# Patient Record
Sex: MALE | Race: WHITE
[De-identification: names, ages, dates, MRNs, and addresses within clinical notes are randomized per-mention and may not be internally consistent; named-entity substitution may affect disease eponyms.]

---

## 2018-08-28 ENCOUNTER — HOSPITAL ENCOUNTER (OUTPATIENT)
Dept: HOSPITAL 25 - ED | Age: 38
Setting detail: OBSERVATION
LOS: 1 days | Discharge: HOME | End: 2018-08-29
Attending: INTERNAL MEDICINE | Admitting: HOSPITALIST
Payer: COMMERCIAL

## 2018-08-28 DIAGNOSIS — R06.09: ICD-10-CM

## 2018-08-28 DIAGNOSIS — Z88.8: ICD-10-CM

## 2018-08-28 DIAGNOSIS — R53.83: ICD-10-CM

## 2018-08-28 DIAGNOSIS — Z88.2: ICD-10-CM

## 2018-08-28 DIAGNOSIS — Z79.899: ICD-10-CM

## 2018-08-28 DIAGNOSIS — I10: ICD-10-CM

## 2018-08-28 DIAGNOSIS — E66.9: ICD-10-CM

## 2018-08-28 DIAGNOSIS — M54.2: ICD-10-CM

## 2018-08-28 DIAGNOSIS — N17.9: ICD-10-CM

## 2018-08-28 DIAGNOSIS — R79.89: ICD-10-CM

## 2018-08-28 DIAGNOSIS — R94.31: ICD-10-CM

## 2018-08-28 DIAGNOSIS — R07.9: Primary | ICD-10-CM

## 2018-08-28 DIAGNOSIS — I51.7: ICD-10-CM

## 2018-08-28 PROCEDURE — 80061 LIPID PANEL: CPT

## 2018-08-28 PROCEDURE — 76770 US EXAM ABDO BACK WALL COMP: CPT

## 2018-08-28 PROCEDURE — 93005 ELECTROCARDIOGRAM TRACING: CPT

## 2018-08-28 PROCEDURE — G0378 HOSPITAL OBSERVATION PER HR: HCPCS

## 2018-08-28 PROCEDURE — 96361 HYDRATE IV INFUSION ADD-ON: CPT

## 2018-08-28 PROCEDURE — 80048 BASIC METABOLIC PNL TOTAL CA: CPT

## 2018-08-28 PROCEDURE — 82550 ASSAY OF CK (CPK): CPT

## 2018-08-28 PROCEDURE — 93306 TTE W/DOPPLER COMPLETE: CPT

## 2018-08-28 PROCEDURE — A9502 TC99M TETROFOSMIN: HCPCS

## 2018-08-28 PROCEDURE — 99283 EMERGENCY DEPT VISIT LOW MDM: CPT

## 2018-08-28 PROCEDURE — 84484 ASSAY OF TROPONIN QUANT: CPT

## 2018-08-28 PROCEDURE — 78452 HT MUSCLE IMAGE SPECT MULT: CPT

## 2018-08-28 PROCEDURE — 81003 URINALYSIS AUTO W/O SCOPE: CPT

## 2018-08-28 PROCEDURE — 96374 THER/PROPH/DIAG INJ IV PUSH: CPT

## 2018-08-28 PROCEDURE — 93017 CV STRESS TEST TRACING ONLY: CPT

## 2018-08-28 PROCEDURE — C8929 TTE W OR WO FOL WCON,DOPPLER: HCPCS

## 2018-08-28 PROCEDURE — 36415 COLL VENOUS BLD VENIPUNCTURE: CPT

## 2018-08-28 RX ADMIN — SODIUM CHLORIDE SCH MLS/HR: 900 IRRIGANT IRRIGATION at 13:35

## 2018-08-28 NOTE — RAD
INDICATION: Elevated creatinine.



COMPARISON:  There are no relevant prior studies available for comparison.



TECHNIQUE:  Multiple real-time images of the kidneys and urinary bladder were obtained.



FINDINGS: The kidneys are normal in shape and echogenicity. The right kidney measured 13.3

x 5.4 x 5.9 cm and the left kidney measured 13.5 x 5.3 x 6.1 cm.  No significant focal

renal abnormality or hydronephrosis is seen.



The bladder is normal in contour. No intraluminal abnormalities are seen. No bladder wall

thickening is noted.



There are bilateral ureteral jets present within the urinary bladder.



The prostate gland measured 3.5 x 2.4 x 4.6 cm for a total volume of 20.0 ml. 



The prevoid volume was 596 ml and a post void residual was 0 ml.



IMPRESSION:  NEGATIVE EXAM.

## 2018-08-28 NOTE — ECHO
Patient:      KYLIE INTERIANO  

Med Rec#:     [id number]            :          1980          

Date:         2018            Age:          37y                 

Account#:     [id number]          Height:       178 cm / 70.1 in

Accession#:   [id number]           Weight:       118 kg / 260.1 lbs

Sex:          M                     BSA:          2.3

Room#:        [facility]                 

Admit Date#:  2018          

Type:         Inpatient

 

Referring:    Jaquelin Horton NP

Reading:      Deep Fletcher MD

Sonographer:  Blanca Veliz RN RDCS

______________________________________________________________________

 

Transthoracic Echocardiogram

 

Indication:

Chest pain

BP:           150/92

HR:           61

Rhythm:       NSR

 

Findings     

History:

HTN, obesity 

 

Left Ventricle:

The left ventricular chamber size is normal. Moderate concentric left

ventricular hypertrophy is observed. Global left ventricular wall motion

and contractility are within normal limits. There is normal left

ventricular systolic function. The estimated ejection fraction is

55-60%.  There is no consistent Doppler evidence of clinically

significant     diastolic dysfunction. 

 

Left Atrium:

The left atrium is mildly dilated. 

 

Right Ventricle:

The right ventricle wall thickness is moderately increased. The right

ventricle is slightly dilated.  The right ventricular global systolic

function is normal. 

 

Right Atrium:

The right atrium is slightly dilated.  

 

Aortic Valve:

The aortic valve is trileaflet. The aortic valve leaflets are mildly

thickened. There is a trace of aortic regurgitation. There is no

evidence of aortic stenosis. 

 

Mitral Valve:

The mitral valve leaflets are mildly thickened. There is a trace of

mitral regurgitation. There is no evidence of mitral stenosis. 

 

Tricuspid Valve:

The tricuspid valve leaflets are normal.  There is trace tricuspid

regurgitation.  Unable to estimate the right ventricular systolic

pressure.   There is no tricuspid stenosis. 

 

Pulmonic Valve:

The pulmonic valve appears normal. There is a trace pulmonic

regurgitation.  There is no pulmonic stenosis.  

 

Pericardium:

There is no significant pericardial effusion. A pericardial fat pad is

visualized. 

 

Aorta:

There is no dilatation of the ascending aorta. There is no dilatation of

the aortic arch. There is no dilation of the aortic root. 

 

Pulmonary Artery:

The main pulmonary artery appears normal. 

 

Venous:

The inferior vena cava appears normal in size. There is a greater than

50% respiratory change in the inferior vena cava dimension. 

 

Contrast:

Definity was used to optimize study. A total of 3 ml of diluted Definity

was given IV. 

 

Conclusions

Global left ventricular wall motion and contractility are within normal

limits.

There is normal left ventricular systolic function.

The estimated ejection fraction is 55-60%. 

The right ventricular global systolic function is normal.

There is a trace of aortic regurgitation.

There is a trace of mitral regurgitation.

There is trace tricuspid regurgitation. 

Unable to estimate the right ventricular systolic pressure.  

There is no significant pericardial effusion.

 

Measurements     

Name                    Value         Normal Range            

RVDdMajor (2D)          4.1 cm        (2.2 - 4.4)             

RVAW (2D)               1.2 cm        (0.2 - 0.5)             

RAd ISD 4CH             4.8 cm        (3.4 - 4.9)             

RA (A4C)W               4.7 cm        (2.9 - 4.6)             

IVSd (2D)               1.6 cm        (0.6 - 1)               

LVPWd (2D)              1.3 cm        (0.6 - 1)               

LVIDd (2D)              4.7 cm        (3.6 - 5.4)             

LVIDs (2D)              3.3 cm        -                        

LV FS (2D)              30 %          (25 - 45)               

Aortic Annulus          2.6 cm        (1.4 - 2.6)             

Ao root diameter (2D)   3.4 cm        (2.1 - 3.5)             

Ascending Ao            3.3 cm        (2.1 - 3.4)             

LA dimension (AP) 2D    3.8 cm        (2.3 - 3.8)             

LAd ISD 4CH             5.6 cm        (2.9 - 5.3)             

LA ISD 4CH W            4.1 cm        (2.5 - 4.5)             

 

Name                    Value         Normal Range            

LA ESV BP (A/L) index   26 ml/m2      -                        

 

Name                    Value         Normal Range            

MV E-wave Vmax          0.63 m/sec    -                        

MV deceleration time    222 msec      -                        

MV A-wave Vmax          0.6 m/sec     -                        

MV E:A ratio            1.1 ratio     -                        

LV septal e' Vmax       0.06 m/sec    -                        

LV lateral e' Vmax      0.11 m/sec    -                        

LV E:e' septal ratio    10.5 ratio    -                        

LV E:e' lateral ratio   5.7 ratio     -                        

 

Name                    Value         Normal Range            

AV Vmax                 1.1 m/sec     -                        

AV VTI                  23.5 cm       -                        

AV peak gradient        5 mmHg        -                        

AV mean gradient        3 mmHg        -                        

LVOT Vmax               0.85 m/sec    -                        

LVOT VTI                16.1 cm       -                        

LVOT peak gradient      3 mmHg        -                        

LVOT mean gradient      2 mmHg        -                        

SAMANTHA Vmax                0.76 m/sec    -                        

 

Name                    Value         Normal Range            

IVC diameter            1 cm          -                        

 

Name                    Value         Normal Range            

PV Vmax                 0.67 m/sec    -                        

 

Electronically signed by: Deep Fletcher MD on 2018 16:44:22

## 2018-08-28 NOTE — HP
CC:  Dr. Elijah Garcia; Dr. Nicola Lee *

 

HISTORY AND PHYSICAL:

 

DATE OF ADMISSION:  08/28/18

 

PRIMARY CARE PROVIDER:  Dr. Nicola Lee

 

ATTENDING PHYSICIAN:  Dr. Karlee Gold * (dictated by Janet Fernandez NP)

 

CHIEF COMPLAINT:  Chest pain.

 

HISTORY OF PRESENT ILLNESS:  Mr. Chao is a 37-year-old male with no previous 
past medical history until approximately 2 months ago when he states he was 
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 
and wigs, he developed tingling in both arms.  He felt he was hydrated that day
, drinking 12 bottles of water.  He said it was 90 degrees out.  He felt 
lightheaded and his face felt tight.  He had difficulty speaking, was only able 
to speak one word at a time. He called EMS and went to the ER in [place], [place] where he received 2 L of saline, was found to have elevated creatinine 
of 1.5.  He was told to follow up with his primary care provider.  He followed 
up with his primary care provider, he was told that his CK was greater than 1000
, he followed up for a month with routine checks on his creatinine.  He was 
then referred to Dr. Elijah Garcia with Rheumatology when his CK continued to be 
elevated.  He states he was started on prednisone, but when that caused him to 
be hypertensive, he stopped taking the prednisone, he is unsure why he was 
placed on the prednisone. He reports having an appointment this Friday, 08/31/18
, with his Rheumatologist.  He also reports having an appointment setup with 
neurologist for test of his muscles. I suspect this is an EMG, but the patient 
is unsure what the test is.  He states that over the last 2 months, he has been 
off of work due to having muscle soreness and tenderness and just feeling 
unwell.  He states that he drove to [place] yesterday to pick stuff up from 
work while walking around in a [employer] for approximately 30 minutes.  He 
started feeling funny.  He checked his blood pressure was 174/114.  The patient 
also reports feeling easily fatigued while doing activity such as mowing the 
lawn over the last 2 months.  He drove home from [place], arriving home at 
approximately 03:15 a.m. when he was feeling unwell.  He went to lay down and 
developed chest pain that he described as a pressure.  His fiancee checked his 
blood pressure was elevated, so they presented to the [place] [facility]. 
While at the [place] ER, he received 1 L of normal saline, subcu Lovenox, 
aspirin to Nitro, and his chest pain essentially resolved.  While there, he had 
labs significant for elevated troponin of 0.03. He had a CK that was 263.  His 
creatinine was slightly elevated at 1.2.  The other labs were unremarkable.  He 
had an EKG showing a sinus bradycardia, rate of 56.  He also had a chest x-ray, 
which I am unable to find the results for at this time.  He was then 
transferred to the [facility] Emergency Room for further cardiac 
workup.

 

While in the ER at [facility], he had an EKG showing a sinus rhythm with a 
rate of 61, no acute sign of ischemia similar to previous EKG done on the same 
day at [place].  The troponin of 0.05, a CK of 225.  The patient was 
essentially chest pain-free.  He denies any recent fevers, chills, shortness of 
breath, although he feels as though his breathing is "slow" at times.  He 
reports feeling intermittently lightheaded and hot.  Denies any diaphoresis or 
discomfort or numbness, tingling, radiating to his jaw.  He reports a tingling 
sensation in his chest.  He denies urinary symptoms, but states that his urine 
is often yellow to a dark color and not clear.  He also reports a foul-smelling 
urine.  Additionally, the patient continues to complain of muscle soreness and 
tenderness. His left chest pain is reproducible with palpation to his left 
upper chest.  The patient reports being hypertensive over the last 2 months.  
Due to his risk factors and his lab work, the hospitalists were asked to 
evaluate the patient for admission.

 

PAST MEDICAL HISTORY:  None.

 

ALLERGIES:  SULFA, BACTRIM.

 

FAMILY HISTORY:  The patient's father, paternal uncle, paternal aunt, and 
paternal grandfather all have history of heart disease.  His father has 
required numerous stents in addition to his other paternal family members.  He 
is unsure of what age their heart disease started.  He denies any family 
history of diabetes and cancer. His maternal family has a history of ALS.

 

SOCIAL HISTORY:  He denies tobacco, alcohol, recreational drug use.  He lives 
with his fiancee, Rose Mary Rodríguez.  His fiancee will be historian and decision 
maker.

 

He is unable to make decisions for himself.

 

REVIEW OF SYSTEMS:  I performed an 11-point review of systems.  All the 
pertinent positives and negatives are mentioned in the history of present 
illness.  The remaining review of systems are negative.

 

                               PHYSICAL EXAMINATION

 

GENERAL APPEARANCE:  The patient is alert, pleasant, and appears to be in no 
acute distress.

 

VITAL SIGNS:  Temperature 98.3, heart rate 61, respiratory rate 18, O2 sat 98% 
on room air, blood pressure 139/95.

 

HEENT:  Normocephalic, atraumatic.  Pupils are equal and reactive to light. 
Extraocular movements are intact.

 

RESPIRATORY:  There is no accessory muscle.  Lungs are clear to auscultation.

 

CARDIOVASCULAR:  Regular rate and rhythm.  S1 and S2 present.  There are no 
murmurs, rubs, or gallops heard.

 

ABDOMEN:  Soft, nontender, nondistended, but obese.  There are bowel sounds 
present x4.

 

EXTREMITIES:  No lower extremity edema.  DP and PT pulses are 2+ and symmetric.

 

MUSCULOSKELETAL:  There is no clubbing or cyanosis noted.  The patient exhibits 
good strength in all extremities. The patient has reproducible chest pain with 
palpation to his right upper chest.

 

NEUROLOGICAL:  The patient is alert and oriented x4.  Cranial nerves II through 
XII are grossly intact.

 

PSYCHOLOGICAL:  The patient is calm and cooperative.

 

SKIN:  There are no rashes or abnormalities seen.

 

 DIAGNOSTIC STUDIES/LAB DATA:  Labs from [facility], sodium 140, 
potassium 4.0, chloride 103, CO2 29, BUN 15, creatinine 1.2, glucose 108.  
White blood cell count 6.12, hemoglobin 16.1, hematocrit 45, platelet count 40, 
glucose 281, , troponin 0.03. Repeat labs here at [facility] today, 
troponin 0.05, .

 

EKG at [place] shows sinus bradycardia, rate of 56, no acute signs of 
ischemia. Repeat EKG here at [facility] shows sinus rhythm with a rate of 61.  No 
acute signs of ischemia similar to previous from [place] today.

 

IMPRESSION:  Mr. Chao is a 37-year-old male with past medical history 
significant for recent hypertension that is currently untreated, who presented 
to the emergency room with complaints of chest pain, was transferred from 
[facility] to [facility] for further cardiac workup.  He will be 
admitted as an observation for chest pain.

 

ASSESSMENT/PLAN:

1.  Chest pain.  At this time, the patient has an unclear etiology.  He has 
risk factors with a JANIE score of 1 including his obesity, hypertension, family 
history, and an elevated troponin that this could be cardiac in nature, but the 
pain is also reproducible with palpation and the patient is also very anxious 
about his unknown diagnosis.  This is then being worked up for over the last 2 
months.  I am going to monitor him on telemetry, trend his troponins.  I am 
going to get an echocardiogram.  If his troponins do not elevate any further, I 
will get an exercise nuclear stress test tomorrow.  If his troponin continues 
to elevate, we will ask Cardiology to consult.  We will check fasting lipids in 
the morning. Start the patient on the statin accordingly.

2.  Hypertension.  The patient reports being hypertensive intermittently over 
the last 2 months with the systolic blood pressures into the 200s, this could 
be contributing to his chest pain.  His blood pressures have improved during 
his time in the emergency room down to 139/95, continued to walk, monitor his 
blood pressure.  If he continues to be hypertensive, I will start him on an 
antihypertensive.

3.  Acute kidney injury.  I am unsure what the patient's baseline creatinine is
, he was elevated 2 months ago in the emergency room with a creatinine of 1.5, 
he is 1.2 today.  I will give him some gentle IV hydration overnight, recheck 
his labs in the morning.  I will also get a renal ultrasound to evaluate for 
causes of his acute versus elevated creatinine.

4.  Obesity.  His BMI is 37, be on a heart-healthy diet.  He has been 
encouraged to wash what he eats.

5.  Recent history of fatigue and muscle tenderness.  The patient is following 
with Rheumatology.  I will try to get his records from the rheumatologist in 
[facility].

6.  Fluids, electrolytes, and nutrition.  He will be on a heart-healthy diet, 
n.p.o. after midnight.  First stress testing in the morning.

7.  Code status.  Full code.

8.  DVT prophylaxis.  He is at low risk and will be encouraged to ambulate.

9.  Disposition.  Observation.

 

TIME SPENT:  Time for this admission was approximately 60 minutes, greater than 
half of that was spent with the patient discussing medications, past medical 
history, the events leading up to his arrival today, and performing a physical 
examination.

 

The case has been reviewed with the attending, Dr. Gold, who agrees with the 
plan of care.

 

 ____________________________________ JANET FERNANDEZ, NP

 

[id number]/CPS #: [id number]

MENDEZ

## 2018-08-28 NOTE — ED
HPI Chest Pain





- HPI Summary


HPI Summary: 


Patient is a 38 y/o M BIBA from [place] w/ c/o chest pain onsetting at 0400 

today. On triage, "Pt with CP since since 0400, states breathing was off, but 

not SOB.  Elevated trop and CK at [place]." He states he was already awake 

when chest pain onset. He states that he has been awake for more that 24 hours. 

After being seen at [place], patient was sent to [facility]. EMS reports he was 

given nitro, lovenox, and aspirin. In the room, he states that he is not 

experiencing chest pain but feels chest "tingling" presently. He also reports 

that he has been experiencing neck pain, which is still present. Per EMS, 

patient stated on the way over that he has been under a lot of stress and lost 

his job recently due to various sicknesses. EMS states patient was experiencing 

heat exhaustion at work and has not been able to work since July 13th due to 

high CK levels. Patient does not smoke, has not had alcohol in a year. No home 

medications reported. He notes dad has cardiac problems. Patient denies HTN, 

HLD. On triage, nothing is noted to aggravate/alleviate Sx. 








- History of Current Complaint


Time Seen by Provider: 08/28/18 09:13


Hx Obtained From: Patient


Onset/Duration: Started Hours Ago - onset 0400 today, Still Present - neck pain

, Resolved - chest pain, now described as chest tingling


Timing: Constant, Lasting Hours - onset 0400


Current Severity: None - pain denied


Pain Intensity: 0


Pain Scale Used: 0-10 Numeric - 0/10


Character: Other: - tingling


Aggravating Factor(s): Nothing


Alleviating Factor(s): Nothing


Associated Signs and Symptoms: Positive: Other: - neck pain, breathing is "off"

, chest tingling





- Allergy/Home Medications


Allergies/Adverse Reactions: 


 Allergies











Allergy/AdvReac Type Severity Reaction Status Date / Time


 


sulfamethoxazole Allergy  Swelling Verified 08/28/18 09:28





[From Bactrim]     


 


trimethoprim [From Bactrim] Allergy  Swelling Verified 08/28/18 09:28











Home Medications: 


 Home Medications





NK [No Home Medications Reported]  08/28/18 [History Confirmed 08/28/18]











PMH/Surg Hx/FS Hx/Imm Hx


Endocrine/Hematology History: Reports: Other Endocrine/Hematological Disorders 

- NEGATIVE: HLD 


Cardiovascular History: 


   Denies: Hx Hypertension





- Family History


Known Family History: Positive: Cardiac Disease - father 





- Social History


Alcohol Use: None - reports not drinking in a year


Smoking Status (MU): Never Smoked Tobacco





Review of Systems


Positive: Chest Pain - in room, described as chest tingling 


Positive: Other - breathing is described as "off" 


Positive: Other - neck pain 


All Other Systems Reviewed And Are Negative: Yes





Physical Exam





- Summary


Physical Exam Summary: 





Appearance: Well appearing, no pain distress


Skin: warm, dry, reflects adequate perfusion


Head/face: normal


Eyes: EOMI, MOIRA


ENT: normal


Neck: supple, non-tender


Respiratory: CTA, breath sounds present


Cardiovascular: RRR, pulses symmetrical 


Abdomen: non-tender, soft


Bowel Sounds: present


Musculoskeletal: normal, strength/ROM intact


Neuro: normal, sensory motor intact, A&Ox3





Triage Information Reviewed: Yes


Vital Signs On Initial Exam: 


Initial Vitals











Temp Pulse Resp BP Pulse Ox


 


 98.3 F   64   18   179/106   98 


 


 08/28/18 09:18  08/28/18 09:18  08/28/18 09:18  08/28/18 09:18  08/28/18 09:18














Vital Signs Reviewed: Yes





Diagnostics





- Laboratory


Lab Statement: Any lab studies that have been ordered have been reviewed, and 

results considered in the medical decision making process.





- EKG


  ** 0920


Cardiac Rate: NL - rate of 61 bpm


EKG Rhythm: Sinus Rhythm


ST Segment: Normal


EKG Interpretation: normal axis, normal intervals 





Chest Pain Course/Dx





- Course


Course Of Treatment: Patient presents stable without chest pain as a transfer 

from outside hospital.  Patient had presented there with chest pain and was 

found to have elevated troponin.  His EKG was nondiagnostic there.  Here, an 

EKG was repeated and also nondiagnostic.  Troponin is again elevated this time 

slightly higher.  He was given Lovenox there and maintained with as needed 

nitroglycerin.  Nitroglycerin paste was applied here.  Hospitalist was 

contacted and will admit the patient.





- Chest Pain


Differential Diagnosis/HQI/PQRI: Acute MI, ACS, Angina





- Diagnoses


Provider Diagnoses: 


 Acute coronary syndrome, Chest pain, Non-ST elevation MI (NSTEMI)








- Provider Notifications


Discussed Care Of Patient With: Karlee Garsia


Time Discussed With Above Provider: 09:13


Instructed by Provider To: Other - Discussed patient's case with Dr. Michaud at 

0913. Elvis Licona accepts patient for admission to [facility]





Discharge





- Sign-Out/Discharge


Documenting (check all that apply): Patient Departure - admit





- Discharge Plan


Condition: Good


Disposition: ADMITTED TO [facility]





- Billing Disposition and Condition


Condition: GOOD


Disposition: Admitted to [facility]





- Attestation Statements


Document Initiated by Scribe: Yes


Documenting Scribe: Ramana Azul


Provider For Whom Kemie is Documenting (Include Credential): Yevgeniy Licea MD 


Scribe Attestation: 


Ramana DODD, scribed for Yevgeniy Licea MD  on 08/28/18 at 1234. 


Scribe Documentation Reviewed: Yes


Provider Attestation: 


The documentation as recorded by the Ramana hazel accurately reflects 

the service I personally performed and the decisions made by me, Yevgeniy Licea MD

## 2018-08-29 VITALS — SYSTOLIC BLOOD PRESSURE: 164 MMHG | DIASTOLIC BLOOD PRESSURE: 85 MMHG

## 2018-08-29 RX ADMIN — SODIUM CHLORIDE SCH MLS/HR: 900 IRRIGANT IRRIGATION at 04:15

## 2018-08-29 NOTE — RAD
Edited for charges.



INDICATION: Chest pain in a patient with multiple cardiac risk factors



COMPARISON: None

 

TECHNIQUE: SPECT imaging was performed. Rest images were acquired following the

intravenous injection of 10.2 millicuries of technetium 99m tetrofosmin at  
0655 hours. At

 1038 hours stress images were acquired following the intravenous 
administration of 25.89

millicuries of technetium 99m tetrofosmin.



A target heart rate of 182 bpm was achieved.



Image quality is limited by lack of attenuation correction due to the patient's 
inability

to elevate his arms over his head for CT imaging.



FINDINGS: There are no defects of the stress-induced or fixed nature. The 
cardiac chamber

size is normal. There are no wall motion abnormalities. The ejection fraction is

calculated at 58% during stress.



IMPRESSION:  No scintigraphic evidence of ischemia or infarction. 



ASSESSMENT:  Low risk



Based on imaging criteria from ACC/AHA 2002 Guideline Update for the Management 
of

Patients With Chronic Stable Angina Table 23. Noninvasive Risk Stratification.



MTDD

## 2018-08-29 NOTE — DS
CC:  Karlee Gold MD; Dr. Yevgeniy Licea *

 

DISCHARGE SUMMARY:

 

DATE OF ADMISSION:  08/28/18

 

DATE OF DISCHARGE:  08/29/18

 

DISCHARGE DIAGNOSES:

1.  Chest pain, likely musculoskeletal and noncardiac pain, stress test is low 
probability for actionable coronary artery disease.

2.  Hypertension.

3.  Mild CPK elevation, improved from previous.

 

DISCHARGE MEDICATIONS:

1.  Tylenol 650 mg p.o. q.6 p.r.n.

2.  Amlodipine 5 mg p.o. daily.

 

HISTORY OF PRESENT ILLNESS/HOSPITAL COURSE:  The patient is a 37-year-old 
 gentleman with no previous documentations of past medical history, 
who currently resides in [place], but spends most of his time during the 
past 1 or 2 months here in [place] with his girlfriend given he is still 
deciding on which state to live in.  He mentions that he has a primary care 
physician in [place], and 2 months prior he was admitted in [place], [place], after working in the hot weather and was found to have an elevated 
creatinine as well as CPK that was greater than 1000.  He mentioned that he 
felt lightheaded and he felt that his face was tight and has had difficulty 
speaking.  However, he did not remember the actual diagnoses that was given to 
him at that time, although he felt that he might have had heat stroke which is 
certainly consistent with his signs and symptoms. Apparently, he was also seen 
by his primary care physician the day after his discharge and was told that his 
CPK was still elevated, but slightly improved from the day previously.  He was 
subsequently referred to Dr. Elijah Garcia of Rheumatology, who was surprised 
that he is seeing a patient that has a CPK less than 5000.  He was then started 
on prednisone, although he know not what the reason was, and apparently there 
was some discussion with his rheumatologist that he has a family history of Vanessa 
Gehrig disease.  He was then referred to a neurologist and has an appointment 
on 08/31/18 for an EMG once again in [place].

 

He mentions that while in [place], he went to a [employer] mall for about an hour 
from store to store with his girlfriend and started feeling some muscle 
soreness and by the time that he arrived in his girlfriend's place here in [place] at around 3:15 a.m., he was feeing unwell and when he lied down, he then 
subsequently developed chest pain that he then described as a chest pressure.  
Therefore, he presented to [facility] where he received 1 L of normal saline, 
subcu Lovenox, aspirin, and nitro and his chest pain then subsequently 
resolved.  He also was found to have a mildly elevated troponin; however, 
subsequent troponin draws were otherwise unremarkable and normal.  It is 
thought that he his mildly elevated troponin is due to his mildly elevated CPK 
at 263.  He then underwent a Lexiscan stress test, which showed low probability 
for any actionable CADs at this time, although he was found to be hypertensive 
on presentation and slightly hypertensive during his hospital course and hence 
he was advised to lose weight and to watch his salt intake and essentially diet 
modifications and the start of 5 mg of amlodipine.

 

I have told him that his history is quite unusual with the CPK of even for a 
diagnosed heat stroke 2 months prior, to have still a mildly elevated CPK at 
this time.  Certainly, given his history and possible suspicion of 
rheumatologist for some other underlying neurological condition on top of an 
acute event 2 months prior is appropriate, and at this time I have advised him 
to continue to follow up with his rheumatologist and neurologist until he is 
sure with which state he wants to live in and to have his documents be sent 
from [place] to us in the event of any future interaction with our practice.
  He mentions he understands the above and will do so.

 

He had been advised to follow up and/or call his PCP within 3 days post DC and 
to follow up with his rheumatologist and neurologist as planned and ask them to 
send us the records for any future encounters with our practice.  He was 
advised to lose weight and to avoid too much salt in his food, more 
specifically less than 2 g per day, which should lead to improved BPs and at 
times discontinuation of antihypertensives in some patients, especially for a 
relatively young patient such as himself.  He was advised that if he is having 
problems and/or if his symptoms worsen, to call his PCP first to see if his 
concerns can be addressed in a timely manner and if not, since he spends most 
of his time in [place] prior to going to the ER, he was advised to call [facility] to discuss his concerns to see whether it is appropriate for 
him to be seen in the clinic versus the ER.  He was advised to call my office 
regarding any questions, concerns or further clarifications regarding his 
discharge plans and prescriptions, and he was advised to take his medications 
as prescribed.  He was also advised to continue to hydrate himself and to avoid 
any strenuous activities until any underlying myositis has been ruled out by 
either his rheumatologist or neurologist given he certainly seems to be at an 
increased risk of myopathy given his history.

 

REVIEW OF SYSTEMS:  He denied any recent headaches, dizziness, fevers, chills, 
nausea, vomiting, chest pain, shortness of breath, increased cough nor sputum 
production, abdominal pain, diarrhea, constipation, pain and/or increased 
urination, myalgias, arthralgias, throat pain, or new skin lesions.  The rest 
of the 14-point review of systems are otherwise unremarkable.

 

PHYSICAL EXAMINATION:  Revealed the most recent vital signs of records with 
blood pressure of 164/85, 97.8 degrees Fahrenheit, 68 beats per minute heart 
rate, 20 per minute respiratory rate, saturating at 97% on room air.  General 
Appearance:  The patient is awake, alert, and oriented x3, not in acute 
distress.  HEENT: Normocephalic, atraumatic.  PERRLA.  Extraocular muscles 
intact.  Negative for icterus.  Moist oral mucosa.  Negative throat erythema.  
Neck is soft, supple with no cervical lymphadenopathy.  No JVD.  Heart:  S1, S2 
within normal limits. Regular rate and rhythm.  No murmurs, rubs, or gallops.  
Chest:  Clear to auscultation bilaterally.  Good air entry.  No wheezes, rales, 
or rhonchi.  Abdomen is soft, nondistended, nontender. Normoactive bowel sounds 
x4 quadrants. Extremities: No cyanosis, clubbing, or edema.  Psychiatric:  No 
active psychosis, depression, suicidal or homicidal ideation.  Skin is warm to 
touch.

 

TIME SPENT:  The total time spent evaluating patient, reviewing pertinent data, 
and appropriate documentation is greater than 30 minutes.

 

 [id number]/[facility] #: [id number]

MENDEZ